# Patient Record
Sex: FEMALE | Race: WHITE | Employment: STUDENT | URBAN - METROPOLITAN AREA
[De-identification: names, ages, dates, MRNs, and addresses within clinical notes are randomized per-mention and may not be internally consistent; named-entity substitution may affect disease eponyms.]

---

## 2023-06-07 NOTE — PROGRESS NOTES
"Subjective   Patient ID: Aspen Cantor is a 18 y.o. female who presents for NEW PT VISIT .  HPI    18-year-old female new here for establishment of care, previously seen by Dr. Dooley of peds 2 years. Has no significant complaints.     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     Lifestyle   - Diet - overall balanced, large sweet tooth, eats lots of protein and vegetables. Meals are healthy but enjoys snacking   - Exercise - 2-3x/week of 1.5-2 miles jogging, sometimes kalesthenics   - Sleep - Overall well uninterrupted. 8-9 hours     General: No constitutional symptoms, significant changes in weight  HEENT: No headaches, changes in vision or hearing, no sinus or dental issues   Cardiac: No chest pain, palpitations, dyspnea on exertion   Lungs: No cough, wheezing, shortness of breath   Abdomen: No abdominal pain, nausea/vomiting, diarrhea or constipation   : No urinary complaints   Musculoskeletal: no joint pains,   Heme: No bleeding or thrombosis issues   Lymph: No swollen lymph glands   Skin: No rashes or lesions   Psych: No reported anxiety or depression       Current Outpatient Medications   Medication Instructions    fexofenadine ODT (ALLEGRA ODT) 30 mg, oral, Daily    fluticasone (Flonase Sensimist) 27.5 mcg/actuation nasal spray 2 sprays, Each Nostril, Daily RT        Objective     /76   Pulse 75   Temp 36.4 °C (97.5 °F)   Ht 1.619 m (5' 3.75\")   Wt 63 kg (139 lb)   BMI 24.05 kg/m²     Physical Exam  General: Awake, alert, appears stated age   Head/eyes/ears: NCAT, EOMI, PERRL, TM WNL, no cerumen  Throat: moist mucus membranes, no pharyngeal erythema  Neck: Supple, nontender, no lymphadenopathy, thyroid exam unremarkable   Breast exam: No palpable lumps, no discharge, no noted axillary lymphadenopathy. Chaperone offered and " declined.  Heart: RRR, no murmurs, rubs or gallops  Lungs: CTA bilaterally, no rhonchi rales or wheezes   Abdomen: Soft, NT/ND  Extremities: No edema, 2+ DP pulses   Skin: No concerning skin lesions on visualized skin   Neuro: AAO x 3, no FND, gait unremarkable        Assessment/Plan   Problem List Items Addressed This Visit          Other    Encounter for preventative adult health care examination    Allergic rhinitis     Trial of flonase plus claritin. If fails with above measures would add on azelastine.          Other Visit Diagnoses       Healthcare maintenance    -  Primary    Relevant Orders    CBC and Auto Differential    Comprehensive Metabolic Panel    Lipid Panel    TSH with reflex to Free T4 if abnormal    Hepatitis C Antibody    Follow Up In [facility] - PCP          Adult Health Examination  Age appropriate screening performed   Depression screen negative   No additional pertinent family history or toxic habits   No high risk sexual behavior, declines STI screen, will perform one time HCV test  Cancer screening:   - Pap smear recommended at 21   -Discussed self breast examinations  -Discussed skin cancer prevention strategies  Immunizations   - Flu shot recommended in October, recommend bivalent booster,   Dental and visual examinations up-to-date  Discussed adequate Vitamin D intake      Followup 1 year

## 2023-06-08 ENCOUNTER — OFFICE VISIT (OUTPATIENT)
Dept: PRIMARY CARE | Facility: CLINIC | Age: 18
End: 2023-06-08
Payer: COMMERCIAL

## 2023-06-08 VITALS
WEIGHT: 139 LBS | BODY MASS INDEX: 23.73 KG/M2 | SYSTOLIC BLOOD PRESSURE: 111 MMHG | HEART RATE: 75 BPM | DIASTOLIC BLOOD PRESSURE: 76 MMHG | TEMPERATURE: 97.5 F | HEIGHT: 64 IN

## 2023-06-08 DIAGNOSIS — Z00.00 ENCOUNTER FOR PREVENTATIVE ADULT HEALTH CARE EXAMINATION: ICD-10-CM

## 2023-06-08 DIAGNOSIS — J30.2 SEASONAL ALLERGIC RHINITIS, UNSPECIFIED TRIGGER: ICD-10-CM

## 2023-06-08 DIAGNOSIS — Z00.00 HEALTHCARE MAINTENANCE: Primary | ICD-10-CM

## 2023-06-08 PROBLEM — J30.9 ALLERGIC RHINITIS: Status: ACTIVE | Noted: 2023-06-08

## 2023-06-08 PROCEDURE — 99385 PREV VISIT NEW AGE 18-39: CPT | Performed by: INTERNAL MEDICINE

## 2023-06-08 PROCEDURE — 1036F TOBACCO NON-USER: CPT | Performed by: INTERNAL MEDICINE

## 2023-06-08 RX ORDER — FLUTICASONE FUROATE 27.5 UG/1
2 SPRAY, METERED NASAL
COMMUNITY

## 2023-06-08 ASSESSMENT — PATIENT HEALTH QUESTIONNAIRE - PHQ9
2. FEELING DOWN, DEPRESSED OR HOPELESS: NOT AT ALL
SUM OF ALL RESPONSES TO PHQ9 QUESTIONS 1 & 2: 0
1. LITTLE INTEREST OR PLEASURE IN DOING THINGS: NOT AT ALL

## 2023-06-08 NOTE — PATIENT INSTRUCTIONS
It was a pleasure to see you today! Here is a list of things we have discussed and to follow up on:   Allergies - Use flonase twice a day - angle towards the corner of your eye. Add on claritin. If this doesn't work, send me a message and I will send something called azelastine.   I have ordered blood and/or urine tests for you to do today. The lab can be found on this floor (2nd floor) next to the pharmacy across from the elevators.   Followup in one year for your physical

## 2023-12-27 ENCOUNTER — TELEPHONE (OUTPATIENT)
Dept: PRIMARY CARE | Facility: CLINIC | Age: 18
End: 2023-12-27

## 2023-12-27 DIAGNOSIS — Z71.3 DIETARY COUNSELING: Primary | ICD-10-CM

## 2024-02-06 ENCOUNTER — TELEMEDICINE CLINICAL SUPPORT (OUTPATIENT)
Dept: NUTRITION | Facility: CLINIC | Age: 19
End: 2024-02-06
Payer: COMMERCIAL

## 2024-02-06 VITALS — BODY MASS INDEX: 23.73 KG/M2 | WEIGHT: 139 LBS | HEIGHT: 64 IN

## 2024-02-06 DIAGNOSIS — Z71.3 DIETARY COUNSELING: ICD-10-CM

## 2024-02-06 PROCEDURE — 97802 MEDICAL NUTRITION INDIV IN: CPT | Mod: 95 | Performed by: DIETITIAN, REGISTERED

## 2024-02-06 PROCEDURE — 97802 MEDICAL NUTRITION INDIV IN: CPT | Performed by: DIETITIAN, REGISTERED

## 2024-02-06 NOTE — PROGRESS NOTES
"Reason for Nutrition Visit:  Pt is a 18 y.o. female being seen at remotely. Pt was referred for Dietary counseling (Z71.3). Referring provider is Sally Rutherford DO with referral effective date of December 27, 2024.     1. Dietary counseling  Referral to Nutrition Services           Nutrition Assessment      Past Medical Hx:  Patient Active Problem List   Diagnosis    Encounter for preventative adult health care examination    Allergic rhinitis      Anthropometrics:  Anthropometrics  Height: 161.9 cm (5' 3.74\")  Weight: 63 kg (139 lb)  BMI (Calculated): 24.05   Weight change:    Significant Weight Change: No    Lab Results   Component Value Date    CHOL 202 (H) 10/04/2019    TRIG 64 10/04/2019    HDL 47.7 10/04/2019        Chemistry    No results found for: \"NA\", \"K\", \"CL\", \"CO2\", \"BUN\", \"CREATININE\", \"GLU\" No results found for: \"CALCIUM\", \"ALKPHOS\", \"AST\", \"ALT\", \"BILITOT\"      Food and Nutrient Hx:     Pt reports having difficulty with eating. She reports she has issues with always being hungry. She also reports she can become shaky and feels like she has low blood glucose. She feel physically ill.   Pt wakes up between 7:00- 8:00 am. She feels okay upon waking.   3 meals are consumed per day and up to 3 snacks.     Dietary Recall:  Meal 1: Breakfast is consumed by 9:00 to include 2 egg cooked with LUX Cooking spray (CHO 0, protein 16) or 2 dates with a Greek yogurt (CHO 30, protein 16). Pt is hungry within 2 hours she consumed 1 chocolate bar (kcal 65, CHO 8.5, protein 1) and another (kcal 190, CHO 29, protein 3 protein).  Meal 2: Lunch is at noon to include whole grain bagel wtih cream cheese (CHO 45, protein <5)  or eggs with toast (CHO 15, protein 16). She feels full but energy is not sustained. If she is not full she will consume chocolate or dried christian. Ay 3:00 she has a craving for something sweet and she will eat s fruit snack (kcal 50, CHO 15, protein 3).Another snack is at 4:30 to include cracker and " hummus or an oat cookie.  Meal 3: Dinner is at 6:00- 7:00 to include 0.5 cup of quinoa,  4 -5 ounces of chicken, and 1 cup of vegetables. She feels full.  Snacks: Pt can snack after dinner.  Fluid Intake: 60- 80 ounces of water per day.    Appetite: Good  Energy Intake: Good > 75 %  Food Allergy: none  Food Intolerance: none  GI Symptoms: bloating, nausea GI Symptoms greater than 2 weeks: yes  Oral Problems: denies  Dentition: own  Sleep Duration/Quality: 7+ hrs continuous  Cooking: Patient  Grocery Shopping: Patient  Dining Out: 1 to 3 times a month             Physical Activity History: Pt walks, jobs and does exercise videos.   Frequency (times/week): 3 (times/week)   Duration (minutes/day): 20 minutes/day   Physical Activities:  Physical Activity  Physical Activity History: Pt walks, jobs and does exercise videos.  Frequency (times/week): 3 (times/week)  Duration (minutes/day): 20 minutes/day  Intensity: Pt  jogs for 20 minutes once a week. Exercise video for 20 minutes 2 times a week. She walkes 20 minutes 2 times a day back and for to work.          Food Preparation  Cooking: Patient  Grocery Shopping: Patient  Dining Out: 1 to 3 times a month    Feelings of Hunger?: Yes and will eat  Physical Feeling: Does not feel fullness and Physically full  Binging: Never  Cravings: Sweet and None. She craves CHO daily.   Energy Levels: Fluctuates    Nutrition Focused Physical Exam:    Performed/Deferred: Deferred due to be being virtual visit      Estimated Energy Needs:    Total Energy Estimated Needs (kCal): 1575 kCal       Total Protein Estimated Needs (g): 63 g   Total Protein Estimated Needs (g/kg): 1 g/kg    Nutrition Diagnosis     Patient has Malnutrition Diagnosis: No          Patient has Nutrition Diagnosis: Yes Diagnosis Status (1): New  Nutrition Diagnosis 1: Food and nutrition related knowledge deficit Related to (1): how to eat to sustain energy better in between meals As Evidenced by (1): reports by pt of  the need to learn.     Diagnosis Status (2): New Nutrition Diagnosis 2: Inconsistent carbohydrate intake  Nutrition Diagnosis 2: Inconsistent carbohydrate intake Related to (2): lack of meal and snack structure As Evidenced by (2): CHO at meals can range between 0- 45 grams at meals.     Diagnosis Status (3): New Nutrition Diagnosis 3: Altered nutrition related to laboratory values  Nutrition Diagnosis 3: Altered nutrition related to laboratory values Related to (3): diet higher in saturated fat with limited fiber intake As Evidenced by (3): total cholesterol has been above target.     Nutrition Interventions/Recommendations   Individualized Nutrition Prescription Provided for : Medical nutrition therapy was given for dietary counseling.        Theoretical Basis/Approach: Nutrition counseling based on cognitive behavioral theory approach Strategies: Nutrition counseling based on motivational interviewing strategy       Nutrition Monitoring and Evaluation   Monitoring and Evaluation Plan: Carbohydrate intake                            Nutrition Goals:   1,575 calories per day to maintain wt. CHO consistent meal plan to reduce feeling of shakiness and low blood glucose. Heart healthy meal plan with a low saturated fat intake to <5 -6 % of energy (less than 10 grams of saturated fat per day), reduced intake of added sugars (<10% of total energy), 25 grams of fiber with intake of viscous fiber to 5 g/day to 10 g/day, plant sterols/stanols to 2 g/day, and 1.1 gram of omega three fatty acids to reduce cholesterol.     Nutrition Recommendations:  Via teach back method patient verbalized understanding of the following topics:  1) Eating three meals per day can increase the metabolism, this is called the thermal effect of eating. Eating three meals burns more calories than two meals consumed per day. Strive to consume breakfast within 1 -2 hours of waking to jump start the metabolism. Aim for breakfast at 8:00- 9:00 am,  snack at 11:00,  lunch at 1:00 pm, snack at 4:00, dinner at 6:00 and snack at 10:00 pm. .   2) Strive to include protein at the meals and even snacks.  Protein at snacks can sustain energy, stabilize blood glucose, and provide more contentment. Protein foods include eggs, egg whites, cheese, nuts, nut butters, Greek yogurt, poultry, meat, fish, tofu or protein bars. It selecting a protein bar, strive to make sure the bar contains at least 14 grams of protein.   3) Aim to be aware of the amount of carbohydrates consumed at meals. It is recommended to consume 30- 45 grams of carbohydrates at meals and 15 grams at snacks to help sustain energy better from one meal to the next. Use the handout to help identify carbohydrates and the amount of carbohydrates contained within the foods.   4) Strive to select snack bars that contain 15 grams of carbohydrates and 7-10 grams of protein.     Educational Handout: Hypoglycemia (not caused by diabetes) nutrition therapy     Vivian Macdonald, MS, RDN, LD, KARLY   Advanced Practice Clinical Dietitian  [email or web address]  Schedule line [phone]  Direct line [phone]     Readiness to Change : Good  Level of Understanding : Good  Anticipated Compliant : Good

## 2024-03-06 ENCOUNTER — APPOINTMENT (OUTPATIENT)
Dept: NUTRITION | Facility: CLINIC | Age: 19
End: 2024-03-06
Payer: COMMERCIAL

## 2024-03-08 ENCOUNTER — TELEMEDICINE CLINICAL SUPPORT (OUTPATIENT)
Dept: NUTRITION | Facility: CLINIC | Age: 19
End: 2024-03-08
Payer: COMMERCIAL

## 2024-03-08 DIAGNOSIS — Z71.3 DIETARY COUNSELING: Primary | ICD-10-CM

## 2024-03-08 PROCEDURE — 97803 MED NUTRITION INDIV SUBSEQ: CPT | Performed by: DIETITIAN, REGISTERED

## 2024-03-08 NOTE — PROGRESS NOTES
"Reason for Nutrition Visit:  Pt is a 18 y.o. female being seen at remotely. Pt was referred for Dietary counseling (Z71.3). Referring provider is Sally Rutherford DO with referral effective date of December 27, 2024.     1. Dietary counseling [Z71.3]             Nutrition Assessment      Past Medical Hx:  Patient Active Problem List   Diagnosis    Encounter for preventative adult health care examination    Allergic rhinitis      Anthropometrics:      Weight change:    Significant Weight Change: No  02/2024 Weight: 63 kg (139 lb)     Lab Results   Component Value Date    CHOL 202 (H) 10/04/2019    TRIG 64 10/04/2019    HDL 47.7 10/04/2019        Chemistry    No results found for: \"NA\", \"K\", \"CL\", \"CO2\", \"BUN\", \"CREATININE\", \"GLU\" No results found for: \"CALCIUM\", \"ALKPHOS\", \"AST\", \"ALT\", \"BILITOT\"      Food and Nutrient Hx:     Pt reports having difficulty with eating. She reports she has issues with always being hungry. She also reports she can become shaky and feels like she has low blood glucose. She feel physically ill.   Pt wakes up between 7:00- 8:00 am. She feels okay upon waking.   3 meals are consumed per day and up to 3 snacks.     Pt had a follow-up appointment. She pleased to report she had not had any low blood glucose. She reports her energy has been sustained better.     Dietary Recall:  Meal 1: Breakfast is consumed by 9:00 to include 2 egg cooked with LUX Cooking spray (CHO 0, protein 16) or 2 dates with a Greek yogurt (CHO 30, protein 16). Pt is hungry within 2 hours she consumed 1 chocolate bar (kcal 65, CHO 8.5, protein 1) and another (kcal 190, CHO 29, protein 3 protein). Or 1 slices of toast with a fruit and 1 -2 eggs (CHO 30)     Snack a 11:00 am yogurt (CHO 15).     Meal 2: Lunch is at noon to include whole grain bagel wtih cream cheese (CHO 45, protein <5)  or eggs with toast (CHO 15, protein 16). She feels full but energy is not sustained. If she is not full she will consume chocolate or dried " christian. Ay 3:00 she has a craving for something sweet and she will eat s fruit snack (kcal 50, CHO 15, protein 3). Lunch at school may be 2 chicken, 1.5 cup of rice and 0.5 cup of rice (CHO 45) or at home to include a bagel of cheese cream with 1 T of PB (CHO 30)      Another snack is at 4:30 to include a protein bar (CHO 15)     Meal 3: Dinner is at 6:00- 7:00 to include 0.5 cup of quinoa,  4 -5 ounces of chicken, and 1 cup of vegetables. She feels full.  Snacks: Pt can snack after dinner.  Fluid Intake: 60- 80 ounces of water per day.  Appetite: Good  Energy Intake: Good > 75 %  Food Allergy: none  Food Intolerance: none  GI Symptoms: bloating, nausea GI Symptoms greater than 2 weeks: yes  Oral Problems: denies  Dentition: own  Sleep Duration/Quality: 7+ hrs continuous  Cooking: Patient  Grocery Shopping: Patient  Dining Out: 1 to 3 times a month    Physical Activities:  Physical Activity History: Pt walks, jobs and does exercise videos.  Frequency (times/week): 3 (times/week)  Duration (minutes/day): 20 minutes/day  Intensity: Pt  jogs for 20 minutes once a week. Exercise video for 20 minutes 2 times a week. She walkes 20 minutes 2 times a day back and for to work.    Feelings of Hunger?: Yes and will eat  Physical Feeling: Does not feel fullness and Physically full  Binging: Never  Cravings: Sweet and None. She craves CHO daily.   Energy Levels: Fluctuates    Nutrition Focused Physical Exam:    Performed/Deferred: Deferred due to be being virtual visit      Estimated Energy Needs:  Total Energy Estimated Needs (kCal): 1575 kCal       Total Protein Estimated Needs (g): 63 g   Total Protein Estimated Needs (g/kg): 1 g/kg    Nutrition Diagnosis     Patient has Nutrition Diagnosis: Yes   Diagnosis Status (1): Ongoing   Nutrition Diagnosis 1: Food and nutrition related knowledge deficit Related to (1): how to eat to sustain energy better in between meals As Evidenced by (1): reports by pt of the need to learn.       Diagnosis Status (2): Ongoing/Improving   Nutrition Diagnosis 2: Inconsistent carbohydrate intake Related to (2): lack of meal and snack structure As Evidenced by (2): CHO at meals can range between 0- 45 grams at meals.      Diagnosis Status (3): Ongoing   Nutrition Diagnosis 3: Altered nutrition related to laboratory values Related to (3): diet higher in saturated fat with limited fiber intake As Evidenced by (3): total cholesterol has been above target.        Nutrition Interventions/Recommendations      Individualized Nutrition Prescription Provided for : Medical nutrition therapy was given for dietary counseling.  Theoretical Basis/Approach: Nutrition counseling based on cognitive behavioral theory approach Strategies: Nutrition counseling based on motivational interviewing strategy    Nutrition Monitoring and Evaluation   Monitoring and Evaluation Plan: Carbohydrate intake       Nutrition Goals:   1,575 calories per day to maintain wt. CHO consistent meal plan to reduce feeling of shakiness and low blood glucose. Heart healthy meal plan with a low saturated fat intake to <5 -6 % of energy (less than 10 grams of saturated fat per day), reduced intake of added sugars (<10% of total energy), 25 grams of fiber with intake of viscous fiber to 5 g/day to 10 g/day, plant sterols/stanols to 2 g/day, and 1.1 gram of omega three fatty acids to reduce cholesterol.     Nutrition Recommendations:  Via teach back method patient verbalized understanding of the following topics:  1) Aim for breakfast at 8:00- 9:00 am, snack at 11:00,  lunch at 1:00 pm, snack at 4:00, dinner at 6:00 and snack at 10:00 pm.   2) Strive to include protein at the meals and even snacks.    3) Aim to be aware of the amount of carbohydrates consumed at meals. It is recommended to consume 45 grams of carbohydrates at meals and 15 grams at snacks to help sustain energy better from one meal to the next. Use the handout to help identify carbohydrates  and the amount of carbohydrates contained within the foods.   4) Strive to select snack bars that contain 15 grams of carbohydrates and 7-10 grams of protein.     Breakfast- 1 -2 eggs, 2 slices of toast and a fruit, 1 -2 eggs on a bagel with a fruit, or 1 cup of cheerios, 1 cup of berries, 1 cup of milk and nuts.     Lunch - 3-5 ounces of chicken, 1.5 cup of rice, and a vegetables or bagel with cream cheese and a fruit with PB    Snack -0.25 cup of humus with crackers or apple with PB or protein bar or cheese stick and 5 -6 crackers     Dinner- 4 ounces of chicken, 1.5 cup of potatoes, and vegetables     Educational Handout: [facility]'s CHO counting guide   Hypoglycemia (not caused by diabetes) nutrition therapy     Vivian Macdonald, MS, RDN, LD, FANANUEL   Advanced Practice Clinical Dietitian  [email or web address]  Schedule line [phone]  Direct line [phone]     Readiness to Change : Good  Level of Understanding : Good  Anticipated Compliant : Good

## 2024-04-12 ENCOUNTER — TELEMEDICINE CLINICAL SUPPORT (OUTPATIENT)
Dept: NUTRITION | Facility: CLINIC | Age: 19
End: 2024-04-12
Payer: COMMERCIAL

## 2024-04-12 DIAGNOSIS — Z71.3 DIETARY COUNSELING: Primary | ICD-10-CM

## 2024-04-12 PROCEDURE — 97803 MED NUTRITION INDIV SUBSEQ: CPT | Performed by: DIETITIAN, REGISTERED

## 2024-04-12 NOTE — PROGRESS NOTES
"Reason for Nutrition Visit:  Pt is a 18 y.o. female being seen at remotely. Pt was referred for Dietary counseling (Z71.3). Referring provider is Sally Rutherford DO with referral effective date of December 27, 2024.     1. Dietary counseling [Z71.3]          Nutrition Assessment      Past Medical Hx:  Patient Active Problem List   Diagnosis    Encounter for preventative adult health care examination    Allergic rhinitis      Anthropometrics:      Weight change:    Significant Weight Change: No  04/12/24 Weight: 139 lbs   02/2024 Weight: 63 kg (139 lb)     Lab Results   Component Value Date    CHOL 202 (H) 10/04/2019    TRIG 64 10/04/2019    HDL 47.7 10/04/2019        Chemistry    No results found for: \"NA\", \"K\", \"CL\", \"CO2\", \"BUN\", \"CREATININE\", \"GLU\" No results found for: \"CALCIUM\", \"ALKPHOS\", \"AST\", \"ALT\", \"BILITOT\"      Food and Nutrient Hx:     Pt reports having difficulty with eating. She reports she has issues with always being hungry. She also reports she can become shaky and feels like she has low blood glucose. She feel physically ill.   Pt wakes up between 7:00- 8:00 am. She feels okay upon waking.   3 meals are consumed per day and up to 3 snacks.     Pt had a follow-up appointment. She pleased to report she had not had any low blood glucose. She reports her energy has been sustained better.     Dietary Recall:  Meal 1: Breakfast is consumed by 9:00 to include 2 egg cooked with LUX Cooking spray (CHO 0, protein 16) or 2 dates with a Greek yogurt (CHO 30, protein 16). Pt is hungry within 2 hours she consumed 1 chocolate bar (kcal 65, CHO 8.5, protein 1) and another (kcal 190, CHO 29, protein 3 protein). Or 1 slices of toast with a fruit and 1 -2 eggs (CHO 30). Or Cheerios with 0.5 cup milk and 10 nuts (protein 5). NOTE: pt does better when 15 grams of protein is consumed.      Snack a 11:00 am yogurt (CHO 15).     Meal 2: Lunch is at noon to include whole grain bagel wtih cream cheese (CHO 45, protein <5)  " or eggs with toast (CHO 15, protein 16). She feels full but energy is not sustained. If she is not full she will consume chocolate or dried christian. Ay 3:00 she has a craving for something sweet and she will eat s fruit snack (kcal 50, CHO 15, protein 3). Lunch at school may be 2 chicken, 1.5 cup of rice and 0.5 cup of rice (CHO 45) or at home to include a bagel of cheese cream with 1 T of PB (CHO 30)      Another snack is at 4:30 to include a protein bar (CHO 15)     Meal 3: Dinner is at 6:00- 7:00 to include 0.5 cup of quinoa,  4 -5 ounces of chicken, and 1 cup of vegetables. She feels full.  Snacks: Pt can snack after dinner.  Fluid Intake: 60- 80 ounces of water per day.  Appetite: Good  Energy Intake: Good > 75 %  Food Allergy: none  Food Intolerance: none  GI Symptoms: bloating, nausea GI Symptoms greater than 2 weeks: yes  Oral Problems: denies  Dentition: own  Sleep Duration/Quality: 7+ hrs continuous  Cooking: Patient  Grocery Shopping: Patient  Dining Out: 1 to 3 times a month    Physical Activities:  Physical Activity History: Pt walks, jobs and does exercise videos.  Frequency (times/week): 3 (times/week)  Duration (minutes/day): 20 minutes/day  Intensity: Pt  jogs for 20 minutes once a week. Exercise video for 20 minutes 2 times a week. She walkes 20 minutes 2 times a day back and for to work.    Feelings of Hunger?: Yes and will eat  Physical Feeling: Does not feel fullness and Physically full  Binging: Never  Cravings: Sweet and None. She craves CHO daily.   Energy Levels: Fluctuates    Nutrition Focused Physical Exam:    Performed/Deferred: Deferred due to be being virtual visit      Estimated Energy Needs:  Total Energy Estimated Needs (kCal): 1575 kCal       Total Protein Estimated Needs (g): 63 g   Total Protein Estimated Needs (g/kg): 1 g/kg    Nutrition Diagnosis     Patient has Nutrition Diagnosis: Yes   Diagnosis Status (1): RESOLVED   Nutrition Diagnosis 1: Food and nutrition related knowledge  deficit Related to (1): how to eat to sustain energy better in between meals As Evidenced by (1): reports by pt of the need to learn.      Diagnosis Status (2): Ongoing/Improving   Nutrition Diagnosis 2: Inconsistent carbohydrate intake Related to (2): lack of meal and snack structure As Evidenced by (2): CHO at meals can range between 0- 45 grams at meals.      Diagnosis Status (3): Ongoing   Nutrition Diagnosis 3: Altered nutrition related to laboratory values Related to (3): diet higher in saturated fat with limited fiber intake As Evidenced by (3): total cholesterol has been above target.        Nutrition Interventions/Recommendations      Individualized Nutrition Prescription Provided for : Medical nutrition therapy was given for dietary counseling.  Theoretical Basis/Approach: Nutrition counseling based on cognitive behavioral theory approach Strategies: Nutrition counseling based on motivational interviewing strategy    Nutrition Monitoring and Evaluation   Monitoring and Evaluation Plan: Carbohydrate intake      QOL   Less cravings   Low blood glucose-not felt anymore      Nutrition Goals:   1,575 calories per day to maintain wt. CHO consistent meal plan to reduce feeling of shakiness and low blood glucose. Heart healthy meal plan with a low saturated fat intake to <5 -6 % of energy (less than 10 grams of saturated fat per day), reduced intake of added sugars (<10% of total energy), 25 grams of fiber with intake of viscous fiber to 5 g/day to 10 g/day, plant sterols/stanols to 2 g/day, and 1.1 gram of omega three fatty acids to reduce cholesterol.     Nutrition Recommendations:  Via teach back method patient verbalized understanding of the following topics:  1) Continue to follow a meal and snack schedule with aim for breakfast at 8:00- 9:00 am, snack at 11:00,  lunch at 1:00 pm, snack at 4:00, dinner at 6:00 and snack at 10:00 pm.   2) Continue to include protein at the meals and even snacks with now making  sure protein at breakfast is around 16 grams. To help increase protein at certain meals such as the cereal, consider either using Fairlife milk as one cup contains 10 grams of protein with 10 nuts or switch cereal to a high protein cereal that contains 10 grams of protein per serving.    3) Continue to aim for 45 grams of carbohydrates at meals and 15 grams at snacks.    Educational Handout: High Protein food List   I's CHO counting guide   Hypoglycemia (not caused by diabetes) nutrition therapy   Next Session: hunger scale     Vivian Macdonald, MS, RDN, LD, FAND   Advanced Practice Clinical Dietitian  [email or web address]  Schedule line [phone]  Direct line [phone]     Readiness to Change : Good  Level of Understanding : Good  Anticipated Compliant : Good

## 2024-08-19 ENCOUNTER — APPOINTMENT (OUTPATIENT)
Dept: OBSTETRICS AND GYNECOLOGY | Facility: CLINIC | Age: 19
End: 2024-08-19
Payer: COMMERCIAL

## 2024-10-21 PROBLEM — D68.1 FACTOR XI DEFICIENCY (MULTI): Status: ACTIVE | Noted: 2024-10-21

## 2024-10-22 ENCOUNTER — LAB (OUTPATIENT)
Dept: LAB | Facility: LAB | Age: 19
End: 2024-10-22
Payer: COMMERCIAL

## 2024-10-22 ENCOUNTER — APPOINTMENT (OUTPATIENT)
Dept: PRIMARY CARE | Facility: CLINIC | Age: 19
End: 2024-10-22
Payer: COMMERCIAL

## 2024-10-22 VITALS
DIASTOLIC BLOOD PRESSURE: 73 MMHG | WEIGHT: 135.2 LBS | HEART RATE: 71 BPM | SYSTOLIC BLOOD PRESSURE: 110 MMHG | OXYGEN SATURATION: 97 % | BODY MASS INDEX: 23.4 KG/M2

## 2024-10-22 DIAGNOSIS — D68.1 FACTOR XI DEFICIENCY (MULTI): ICD-10-CM

## 2024-10-22 DIAGNOSIS — Z00.00 ENCOUNTER FOR PREVENTATIVE ADULT HEALTH CARE EXAMINATION: ICD-10-CM

## 2024-10-22 DIAGNOSIS — Z00.00 ENCOUNTER FOR PREVENTATIVE ADULT HEALTH CARE EXAMINATION: Primary | ICD-10-CM

## 2024-10-22 LAB
ALBUMIN SERPL BCP-MCNC: 4.4 G/DL (ref 3.4–5)
ALP SERPL-CCNC: 50 U/L (ref 33–110)
ALT SERPL W P-5'-P-CCNC: 10 U/L (ref 7–45)
ANION GAP SERPL CALC-SCNC: 7 MMOL/L (ref 10–20)
AST SERPL W P-5'-P-CCNC: 15 U/L (ref 9–39)
BASOPHILS # BLD AUTO: 0.05 X10*3/UL (ref 0–0.1)
BASOPHILS NFR BLD AUTO: 0.9 %
BILIRUB SERPL-MCNC: 0.3 MG/DL (ref 0–1.2)
BUN SERPL-MCNC: 9 MG/DL (ref 6–23)
CALCIUM SERPL-MCNC: 9.7 MG/DL (ref 8.6–10.6)
CHLORIDE SERPL-SCNC: 104 MMOL/L (ref 98–107)
CHOLEST SERPL-MCNC: 240 MG/DL (ref 0–199)
CHOLESTEROL/HDL RATIO: 4.1
CO2 SERPL-SCNC: 33 MMOL/L (ref 21–32)
CREAT SERPL-MCNC: 0.78 MG/DL (ref 0.5–1.05)
EGFRCR SERPLBLD CKD-EPI 2021: >90 ML/MIN/1.73M*2
EOSINOPHIL # BLD AUTO: 0.1 X10*3/UL (ref 0–0.7)
EOSINOPHIL NFR BLD AUTO: 1.9 %
ERYTHROCYTE [DISTWIDTH] IN BLOOD BY AUTOMATED COUNT: 12.6 % (ref 11.5–14.5)
EST. AVERAGE GLUCOSE BLD GHB EST-MCNC: 105 MG/DL
GLUCOSE SERPL-MCNC: 68 MG/DL (ref 74–99)
HBA1C MFR BLD: 5.3 %
HCT VFR BLD AUTO: 41.5 % (ref 36–46)
HDLC SERPL-MCNC: 59.1 MG/DL
HGB BLD-MCNC: 13.3 G/DL (ref 12–16)
IMM GRANULOCYTES # BLD AUTO: 0.01 X10*3/UL (ref 0–0.7)
IMM GRANULOCYTES NFR BLD AUTO: 0.2 % (ref 0–0.9)
LDLC SERPL CALC-MCNC: 158 MG/DL
LYMPHOCYTES # BLD AUTO: 1.63 X10*3/UL (ref 1.2–4.8)
LYMPHOCYTES NFR BLD AUTO: 30.9 %
MCH RBC QN AUTO: 29 PG (ref 26–34)
MCHC RBC AUTO-ENTMCNC: 32 G/DL (ref 32–36)
MCV RBC AUTO: 91 FL (ref 80–100)
MONOCYTES # BLD AUTO: 0.34 X10*3/UL (ref 0.1–1)
MONOCYTES NFR BLD AUTO: 6.5 %
NEUTROPHILS # BLD AUTO: 3.14 X10*3/UL (ref 1.2–7.7)
NEUTROPHILS NFR BLD AUTO: 59.6 %
NON HDL CHOLESTEROL: 181 MG/DL (ref 0–149)
NRBC BLD-RTO: 0 /100 WBCS (ref 0–0)
PLATELET # BLD AUTO: 210 X10*3/UL (ref 150–450)
POTASSIUM SERPL-SCNC: 4.3 MMOL/L (ref 3.5–5.3)
PROT SERPL-MCNC: 6.7 G/DL (ref 6.4–8.2)
RBC # BLD AUTO: 4.58 X10*6/UL (ref 4–5.2)
SODIUM SERPL-SCNC: 140 MMOL/L (ref 136–145)
TRIGL SERPL-MCNC: 113 MG/DL (ref 0–149)
TSH SERPL-ACNC: 1.81 MIU/L (ref 0.44–3.98)
VLDL: 23 MG/DL (ref 0–40)
WBC # BLD AUTO: 5.3 X10*3/UL (ref 4.4–11.3)

## 2024-10-22 PROCEDURE — 80061 LIPID PANEL: CPT

## 2024-10-22 PROCEDURE — 1036F TOBACCO NON-USER: CPT | Performed by: INTERNAL MEDICINE

## 2024-10-22 PROCEDURE — 99395 PREV VISIT EST AGE 18-39: CPT | Performed by: INTERNAL MEDICINE

## 2024-10-22 PROCEDURE — 36415 COLL VENOUS BLD VENIPUNCTURE: CPT

## 2024-10-22 PROCEDURE — 85025 COMPLETE CBC W/AUTO DIFF WBC: CPT

## 2024-10-22 PROCEDURE — 84443 ASSAY THYROID STIM HORMONE: CPT

## 2024-10-22 PROCEDURE — 80053 COMPREHEN METABOLIC PANEL: CPT

## 2024-10-22 PROCEDURE — 83036 HEMOGLOBIN GLYCOSYLATED A1C: CPT

## 2024-10-22 ASSESSMENT — PATIENT HEALTH QUESTIONNAIRE - PHQ9
SUM OF ALL RESPONSES TO PHQ9 QUESTIONS 1 & 2: 0
1. LITTLE INTEREST OR PLEASURE IN DOING THINGS: NOT AT ALL
2. FEELING DOWN, DEPRESSED OR HOPELESS: NOT AT ALL

## 2024-10-22 NOTE — PATIENT INSTRUCTIONS
Aspen,   I have ordered blood and/or urine tests for you to do today. The lab can be found on this floor (2nd floor) next to the pharmacy across from the elevators.    Pap smear - I recommend Dr. Brennan Kelly, Dr. Katy Vallecillo ([phone]), Dr. Anuja Ruff ([phone])  or Dr. Ani Centeno ([phone]).   Take Vitamin D3 2221-2864 units every day.   Followup 1-2 years

## 2024-10-22 NOTE — PROGRESS NOTES
Subjective     Patient ID: Aspen Cantor is a 19 y.o. female who presents for No chief complaint on file..  HPI    19-year-old female here for preventive care visit, last seen June of last year.    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      Lifestyle - lost some weight since last being seen.  - Diet - tries to eat as healthy as she can while trying to stay full, needs carbs to stay full, lots of peanut butter. Tries to eat fruit, hummus.    - Exercise - 2-3x/week of 1.5-2 miles jogging, sometimes kalesthenics, walks a lot in [place]. Does resistance training as welll 20 minutes a week.   - Sleep - Overall well uninterrupted. 8-9 hours     Objective       Current Outpatient Medications:     fexofenadine ODT (Allegra ODT) 30 mg disintegrating tablet, Take 1 tablet (30 mg) by mouth once daily., Disp: , Rfl:     fluticasone (Flonase Sensimist) 27.5 mcg/actuation nasal spray, Administer 2 sprays into each nostril once daily., Disp: , Rfl:       There were no vitals taken for this visit.      Physical Examination:   General: Awake, alert, appears stated age   Head/eyes/ears: NCAT, EOMI, PERRL, TM WNL, no cerumen  Throat: moist mucus membranes, no pharyngeal erythema  Neck: Supple, nontender, no lymphadenopathy, thyroid exam unremarkable   Breast exam: declined (performed last year)   Heart: RRR, no murmurs, rubs or gallops  Lungs: CTA bilaterally, no rhonchi rales or wheezes   Abdomen: Soft, NT/ND  Extremities: No edema, 2+ DP pulses   Skin: No concerning skin lesions on visualized skin   Neuro: AAO x 3, no FND, gait unremarkable    Assessment/Plan   Assessment & Plan  Encounter for  preventative adult health care examination  Adult Health Examination  Age appropriate screening performed   Healthy lifestyle reviewed.   Depression screen negative   No additional pertinent family history or toxic habits   No high risk sexual behavior, declines STI screen   Cancer screening:   - Colonoscopy at 45   - Mammogram  at 40   - Pap smear due   - Skin cancer prevention strategies reviewed   Immunizations: Flu declined, COVID booster declined, Tdap UTD, HPV and Hep B vaccinations UTD .  Dental and visual examinations UTD   Discussed adequate Vitamin D intake   Orders:    CBC and Auto Differential; Future    Comprehensive Metabolic Panel; Future    Lipid Panel; Future    Hemoglobin A1C; Future    TSH with reflex to Free T4 if abnormal; Future    Follow Up In [facility] - PCP - Health Maintenance; Future    Factor XI deficiency (Multi)    - Clinically asymptomatic, no indication for intervention       Followup 1-2 years

## 2024-10-22 NOTE — ASSESSMENT & PLAN NOTE
Adult Health Examination  Age appropriate screening performed   Healthy lifestyle reviewed.   Depression screen negative   No additional pertinent family history or toxic habits   No high risk sexual behavior, declines STI screen   Cancer screening:   - Colonoscopy at 45   - Mammogram  at 40   - Pap smear due   - Skin cancer prevention strategies reviewed   Immunizations: Flu declined, COVID booster declined, Tdap UTD, HPV and Hep B vaccinations UTD .  Dental and visual examinations UTD   Discussed adequate Vitamin D intake   Orders:    CBC and Auto Differential; Future    Comprehensive Metabolic Panel; Future    Lipid Panel; Future    Hemoglobin A1C; Future    TSH with reflex to Free T4 if abnormal; Future    Follow Up In [facility] - PCP - Health Maintenance; Future

## 2025-01-16 ENCOUNTER — TELEPHONE (OUTPATIENT)
Dept: OBSTETRICS AND GYNECOLOGY | Facility: CLINIC | Age: 20
End: 2025-01-16
Payer: COMMERCIAL

## 2025-01-30 ENCOUNTER — APPOINTMENT (OUTPATIENT)
Dept: OBSTETRICS AND GYNECOLOGY | Facility: CLINIC | Age: 20
End: 2025-01-30
Payer: COMMERCIAL

## 2025-01-30 VITALS
BODY MASS INDEX: 21.51 KG/M2 | HEIGHT: 64 IN | SYSTOLIC BLOOD PRESSURE: 99 MMHG | WEIGHT: 126 LBS | DIASTOLIC BLOOD PRESSURE: 65 MMHG

## 2025-01-30 DIAGNOSIS — N89.8 VAGINAL ODOR: ICD-10-CM

## 2025-01-30 DIAGNOSIS — N89.8 VAGINAL DISCHARGE: ICD-10-CM

## 2025-01-30 DIAGNOSIS — Z01.419 WELL WOMAN EXAM WITH ROUTINE GYNECOLOGICAL EXAM: Primary | ICD-10-CM

## 2025-01-30 PROCEDURE — 3008F BODY MASS INDEX DOCD: CPT | Performed by: OBSTETRICS & GYNECOLOGY

## 2025-01-30 PROCEDURE — 1036F TOBACCO NON-USER: CPT | Performed by: OBSTETRICS & GYNECOLOGY

## 2025-01-30 PROCEDURE — 99385 PREV VISIT NEW AGE 18-39: CPT | Performed by: OBSTETRICS & GYNECOLOGY

## 2025-01-30 SDOH — ECONOMIC STABILITY: FOOD INSECURITY: WITHIN THE PAST 12 MONTHS, THE FOOD YOU BOUGHT JUST DIDN'T LAST AND YOU DIDN'T HAVE MONEY TO GET MORE.: NEVER TRUE

## 2025-01-30 SDOH — ECONOMIC STABILITY: INCOME INSECURITY: IN THE LAST 12 MONTHS, WAS THERE A TIME WHEN YOU WERE NOT ABLE TO PAY THE MORTGAGE OR RENT ON TIME?: NO

## 2025-01-30 SDOH — ECONOMIC STABILITY: FOOD INSECURITY: WITHIN THE PAST 12 MONTHS, YOU WORRIED THAT YOUR FOOD WOULD RUN OUT BEFORE YOU GOT MONEY TO BUY MORE.: NEVER TRUE

## 2025-01-30 SDOH — ECONOMIC STABILITY: TRANSPORTATION INSECURITY
IN THE PAST 12 MONTHS, HAS THE LACK OF TRANSPORTATION KEPT YOU FROM MEDICAL APPOINTMENTS OR FROM GETTING MEDICATIONS?: NO

## 2025-01-30 SDOH — ECONOMIC STABILITY: TRANSPORTATION INSECURITY
IN THE PAST 12 MONTHS, HAS LACK OF TRANSPORTATION KEPT YOU FROM MEETINGS, WORK, OR FROM GETTING THINGS NEEDED FOR DAILY LIVING?: NO

## 2025-01-30 ASSESSMENT — SOCIAL DETERMINANTS OF HEALTH (SDOH)
WITHIN THE LAST YEAR, HAVE YOU BEEN AFRAID OF YOUR PARTNER OR EX-PARTNER?: NO
WITHIN THE LAST YEAR, HAVE YOU BEEN HUMILIATED OR EMOTIONALLY ABUSED IN OTHER WAYS BY YOUR PARTNER OR EX-PARTNER?: NO
WITHIN THE LAST YEAR, HAVE TO BEEN RAPED OR FORCED TO HAVE ANY KIND OF SEXUAL ACTIVITY BY YOUR PARTNER OR EX-PARTNER?: NO
WITHIN THE LAST YEAR, HAVE YOU BEEN KICKED, HIT, SLAPPED, OR OTHERWISE PHYSICALLY HURT BY YOUR PARTNER OR EX-PARTNER?: NO
HOW HARD IS IT FOR YOU TO PAY FOR THE VERY BASICS LIKE FOOD, HOUSING, MEDICAL CARE, AND HEATING?: NOT HARD AT ALL

## 2025-01-30 ASSESSMENT — PATIENT HEALTH QUESTIONNAIRE - PHQ9
2. FEELING DOWN, DEPRESSED OR HOPELESS: NOT AT ALL
SUM OF ALL RESPONSES TO PHQ9 QUESTIONS 1 AND 2: 0
1. LITTLE INTEREST OR PLEASURE IN DOING THINGS: NOT AT ALL

## 2025-01-30 ASSESSMENT — LIFESTYLE VARIABLES
AUDIT-C TOTAL SCORE: 0
HOW OFTEN DO YOU HAVE A DRINK CONTAINING ALCOHOL: NEVER
HOW OFTEN DO YOU HAVE SIX OR MORE DRINKS ON ONE OCCASION: NEVER
HOW MANY STANDARD DRINKS CONTAINING ALCOHOL DO YOU HAVE ON A TYPICAL DAY: PATIENT DOES NOT DRINK
SKIP TO QUESTIONS 9-10: 1

## 2025-01-30 NOTE — PROGRESS NOTES
"Aspen Cantor is a 19 y.o. female who is here for a annual exam.     Periods are regular every 28-30 days, lasting 6  days.       Sexually active: Abstinence    Regular self breast exam: yes  no concerns on her exams    Menstrual History:  OB History          0    Para   0    Term   0       0    AB   0    Living   0         SAB   0    IAB   0    Ectopic   0    Multiple   0    Live Births   0                Patient's last menstrual period was 2025.         Review of Systems  All Normal Review of Systems  Constitutional: no fever, no chills, no recent weight gain, no recent weight loss and no fatigue.    Gastrointestinal: no abdominal pain, no constipation, no nausea, no diarrhea and no vomiting.    Genitourinary: no dysuria, no urinary incontinence, no vaginal dryness, no vaginal itching, no dyspareunia, no pelvic pain, no dysmenorrhea, no sexual problems, no change in urinary frequency, + vaginal discharge, no unexplained vaginal bleeding and no lesion/sore.  Patient with vaginal odor   Breasts: No masses.  No nipple discharge.  No redness    Objective   BP 99/65   Ht 1.626 m (5' 4\")   Wt 57.2 kg (126 lb)   LMP 2025   BMI 21.63 kg/m²     OBGyn Exam   Physical Exam  Constitutional: Alert and in no acute distress. Well developed, well nourished.   Head and Face: Head and face: Normal.    Eyes: Normal external exam - nonicteric sclera, extraocular movements intact (EOMI) and no ptosis.   Ears, Nose, Mouth, and Throat: External inspection of ears and nose: Normal.    Neck: No neck asymmetry. Supple. Thyroid not enlarged and there were no palpable thyroid nodules.   Chest: Breasts: Normal appearance, no nipple discharge and no skin changes. Palpation of breasts and axillae: No palpable mass and no axillary lymphadenopathy.   Abdomen: Soft nontender; no abdominal mass palpated. No organomegaly. No hernias.     Genitourinary:   External genitalia: Normal. No inguinal lymphadenopathy. Bartholin's " Urethral and Skenes Glands: Normal. Urethra: Normal.    Bladder: Normal on palpation.   Vagina: Normal. No discharge  Cervix: Normal.    Uterus: Normal.    Right Adnexa/parametria: Normal.  Left Adnexa/parametria: Normal.    Inspection of Perianal Area: Normal.     Musculoskeletal: No joint swelling seen, normal movements of all extremities.   Skin: Normal skin color and pigmentation, normal skin turgor, and no rash.   Neurologic: Non-focal. Grossly intact.   Psychiatric: Alert and oriented x 3. Affect normal to patient baseline. Mood: Appropriate.      Assessment/Plan   1) Annual exam:  Normal exam today  Declined Pap as she is never been sexually active and getting  next week    2) Vaginal odor with discharge  Vaginal panel sent    Thank you again for your visit to our office today  Please follow-up as needed or in 1 year with your annual exam

## 2025-02-01 LAB
BV SCORE VAG QL: NORMAL
YEAST SPEC QL CULT: NORMAL

## 2025-02-07 LAB
BV SCORE VAG QL: NORMAL
YEAST SPEC QL CULT: NORMAL